# Patient Record
Sex: FEMALE | Race: WHITE
[De-identification: names, ages, dates, MRNs, and addresses within clinical notes are randomized per-mention and may not be internally consistent; named-entity substitution may affect disease eponyms.]

---

## 2018-07-17 ENCOUNTER — HOSPITAL ENCOUNTER (OUTPATIENT)
Dept: HOSPITAL 43 - DL.ENDO | Age: 58
Discharge: HOME | End: 2018-07-17
Attending: INTERNAL MEDICINE
Payer: COMMERCIAL

## 2018-07-17 VITALS — SYSTOLIC BLOOD PRESSURE: 102 MMHG | DIASTOLIC BLOOD PRESSURE: 68 MMHG

## 2018-07-17 DIAGNOSIS — E66.09: ICD-10-CM

## 2018-07-17 DIAGNOSIS — B96.81: ICD-10-CM

## 2018-07-17 DIAGNOSIS — E03.9: ICD-10-CM

## 2018-07-17 DIAGNOSIS — K20.0: ICD-10-CM

## 2018-07-17 DIAGNOSIS — K83.0: ICD-10-CM

## 2018-07-17 DIAGNOSIS — K29.50: ICD-10-CM

## 2018-07-17 DIAGNOSIS — K21.9: Primary | ICD-10-CM

## 2018-07-17 PROCEDURE — 43239 EGD BIOPSY SINGLE/MULTIPLE: CPT

## 2018-07-17 PROCEDURE — 87077 CULTURE AEROBIC IDENTIFY: CPT

## 2018-07-17 NOTE — OR
DATE:  07/17/2018

 

PROCEDURES:  Esophagogastroduodenoscopy, narrow-band imaging, and multiple pinch

biopsies.

 

INSTRUMENT USED:  GIF-H180 Olympus video panendoscope.

 

PREMEDICATIONS:  No oral topical anesthesia used.  Fentanyl 100 mcg intravenous,

Versed 2 mg intravenous.  Nasal 2 L O2 cannula.

 

The procedure was done under pulse oximetry, BP recording, and cardiac monitor.

 

INDICATION:  The patient with solid dysphagia of intermittent nature, more

frequent recently.

 

DESCRIPTION OF PROCEDURE:  Esophagogastroduodenoscopy is performed for detection

of any active erosive lesions.  Day esophagus and/or malignancy also under

consideration.  H. pylori status to be determined.  Esophageal dilatations if

indicated.  Endoscopic hemostasis therapy if needed.

 

The scope was passed with ease.  Adequate visualization of the esophagus was

made from proximal to distal areas.  No upper esophageal lesions identified.  No

distal esophageal stricture.  No uphill or downhill esophageal varices.  No

Darshana-Hull tear.  Grade A erosive changes were noted by [place] criteria.

Esophagus visualized showed ringed contour.  NBI views were obtained.  Four

quadrant biopsies were taken from the distal and mid esophagus and sent for any

histopathologic evidence of esophageal eosinophilia.  No esophageal polyp or

tumor mass identified.  No proximal gastric varices noted.  Gastric fundus

examination by retroflexion showed no polypoid lesions.  No gastric ulcer,

malignant mass, or vascular ectasia identified.  Multiple pinch biopsies were

taken from the gastric antrum and proximal body and sent for PyloriTek test for

H. pylori and histopathology.  Duodenal bulb showed no ulcer.  Visualized second

part of the duodenum was unremarkable.  No bleeding was noted from any of the

visualized areas at the completion of examination.  Photographs were taken of

the duodenal bulb, gastric antrum, fundus, distal as well as midesophagus.

 

IMPRESSION:  Grade A gastroesophageal reflux disease.

 

The patient tolerated the procedure well.

 

DD:  07/17/2018 07:43:54

DT:  07/17/2018 08:22:29

[facility]

Job #:  [id number]

## 2018-09-18 ENCOUNTER — HOSPITAL ENCOUNTER (OUTPATIENT)
Dept: HOSPITAL 43 - DL.ENDO | Age: 58
Discharge: HOME | End: 2018-09-18
Attending: INTERNAL MEDICINE
Payer: COMMERCIAL

## 2018-09-18 VITALS — DIASTOLIC BLOOD PRESSURE: 76 MMHG | SYSTOLIC BLOOD PRESSURE: 124 MMHG

## 2018-09-18 DIAGNOSIS — K20.0: Primary | ICD-10-CM

## 2018-09-18 DIAGNOSIS — K21.9: ICD-10-CM

## 2018-09-18 PROCEDURE — 43239 EGD BIOPSY SINGLE/MULTIPLE: CPT

## 2018-09-18 NOTE — OR
DATE:  09/18/2018

 

PROCEDURES:  Esophagogastroduodenoscopy, narrow-band imaging, and multiple pinch

biopsies.

 

INSTRUMENT USED:  GIF-H180 Olympus video panendoscope.

 

PREMEDICATIONS:  No oral topical anesthesia used.  Fentanyl 100 mcg intravenous,

Versed 2 mg intravenous.

 

The procedure was done under pulse oximetry, BP recording, and cardiac monitor.

 

INDICATIONS:  The patient with longstanding heartburn with known esophageal

eosinophilia, getting treated with high-dose PPI.

 

DESCRIPTION OF PROCEDURE:  Followup esophagogastroduodenoscopy is performed for

verification as to presence of eosinophilia and suspected eosinophilic

esophagitis.  Biopsies to be obtained.  Endoscopic hemostasis therapy if needed.

 

The scope was passed with ease.  Adequate visualization of the esophagus was

made from proximal to distal areas.  No upper esophageal lesions identified.  No

distal esophageal stricture.  No uphill or downhill esophageal varices.  No

Darshana-Hull tear.  No evidence of erosive esophagitis by [place] criteria.

No esophageal polyp or tumor mass identified.  Small sliding hiatal hernia was

noted.  Four-quadrant biopsies were taken from the pink columnar epithelium

noted at 36 cm distal to the oral verge and sent for any histopathologic

evidence of intestinal metaplasia.  Macroscopic features were noted consistent

with eosinophilic esophagitis.  NBI views were obtained.  Photographs were

taken.  Four-quadrant biopsies were taken from the distal and proximal

esophageal area and sent for any histopathologic evidence of eosinophilic

esophagitis.  No proximal gastric varices noted.  Gastric fundus examination by

retroflexion showed no polypoid lesions.  No gastric ulcer, malignant mass, or

vascular ectasia identified.  Duodenal bulb showed no ulcer.  Visualized second

part of the duodenum was unremarkable.  No bleeding was noted from any of the

visualized areas at the completion of examination.  Photographs were taken of

the duodenal bulb, gastric antrum, fundus, and distal esophagus.

 

IMPRESSION:

1. Sliding hiatal hernia.

2. Eosinophilic esophagitis.

 

The patient tolerated the procedure well.

 

DD:  09/18/2018 07:30:06

DT:  09/18/2018 10:49:29

[facility]

Job #:  [id number]